# Patient Record
Sex: FEMALE
[De-identification: names, ages, dates, MRNs, and addresses within clinical notes are randomized per-mention and may not be internally consistent; named-entity substitution may affect disease eponyms.]

---

## 2021-04-30 ENCOUNTER — NURSE TRIAGE (OUTPATIENT)
Dept: OTHER | Facility: CLINIC | Age: 51
End: 2021-04-30

## 2021-05-01 NOTE — TELEPHONE ENCOUNTER
.Brief description of triage: Took all of day 1 prednisone by mistake at one time. Triage indicates for patient to call Poison Control now. Care advice provided, patient verbalizes understanding; denies any other questions or concerns; instructed to call back for any new or worsening symptoms. This triage is a result of a call to [address] [place] [address]. Please do not respond to the triage nurse through this encounter. Any subsequent communication should be directly with the patient. Reason for Disposition   MORE THAN A DOUBLE DOSE of a prescription or over-the-counter (OTC) drug    Answer Assessment - Initial Assessment Questions  1. NAME of MEDICATION: \"What medicine are you calling about? \"      Prednisone     2. QUESTION: Debbie Haven is your question? \"      I took too much of the 1st dose. 3.   PRESCRIBING HCP: \"Who prescribed it? \" Reason: if prescribed by specialist, call should be referred to that group. Dentist, Root canal     4. SYMPTOMS: \"Do you have any symptoms? \"      Jittery     5. SEVERITY: If symptoms are present, ask \"Are they mild, moderate or severe? \"      Mild     6. PREGNANCY:  \"Is there any chance that you are pregnant? \" \"When was your last menstrual period? \"      N/A    Protocols used: MEDICATION QUESTION CALL-ADULT-